# Patient Record
Sex: FEMALE | Race: WHITE
[De-identification: names, ages, dates, MRNs, and addresses within clinical notes are randomized per-mention and may not be internally consistent; named-entity substitution may affect disease eponyms.]

---

## 2017-06-07 ENCOUNTER — HOSPITAL ENCOUNTER (INPATIENT)
Dept: HOSPITAL 41 - JD.OB | Age: 27
LOS: 1 days | Discharge: HOME | End: 2017-06-08
Attending: OBSTETRICS & GYNECOLOGY | Admitting: OBSTETRICS & GYNECOLOGY
Payer: COMMERCIAL

## 2017-06-07 DIAGNOSIS — O09.293: ICD-10-CM

## 2017-06-07 DIAGNOSIS — Z3A.38: ICD-10-CM

## 2017-06-07 PROCEDURE — 4A1HXFZ MONITORING OF PRODUCTS OF CONCEPTION, CARDIAC RHYTHM, EXTERNAL APPROACH: ICD-10-PCS | Performed by: OBSTETRICS & GYNECOLOGY

## 2017-06-07 PROCEDURE — 0HQ9XZZ REPAIR PERINEUM SKIN, EXTERNAL APPROACH: ICD-10-PCS | Performed by: OBSTETRICS & GYNECOLOGY

## 2017-06-07 PROCEDURE — 3E0R3BZ INTRODUCTION OF ANESTHETIC AGENT INTO SPINAL CANAL, PERCUTANEOUS APPROACH: ICD-10-PCS

## 2017-06-07 NOTE — PCM.LDHP
L&D History of Present Illness





- General


Date of Service: 17


Admit Problem/Dx: 


 Patient Status Order with Admit Dx/Problem





17 06:49


Patient Status [ADT] Routine 








 Admission Diagnosis/Problem











Admission Diagnosis/Problem    Pregnancy














Source of Information: Patient


History Limitations: Reports: No Limitations





- History of Present Illness


Introduction:: 





Patient is a 25 y/o  at 38 6/7 wks who presents in active labor.  

Contractions started yesterday night, but did not get regular and painful until 

this morning.  Patient's pregnancy history is notable for a 24 week loss of a 

baby girl. All testing done at that time was negative. She then went on to have 

a full term delivery of a healthy baby boy.  This pregnancy has been 

uncomplicated. 





- Related Data


Allergies/Adverse Reactions: 


 Allergies











Allergy/AdvReac Type Severity Reaction Status Date / Time


 


No Known Allergies Allergy   Verified 14 21:48 CDT











Home Medications: 


 Home Meds





Magnesium  14 [History]


ZQC925/Iron Fumarate/FA/DSS [Prenatal 19 Tablet]  14 [History]


Acetaminophen [Tylenol Extra Strength] 500 mg PO Q4H PRN #30 tab 14 [Rx]


Ibuprofen [Motrin] 400 mg PO Q4H PRN #30 tablet 14 [Rx]











Past Medical History


Genitourinary History: Reports: Renal Calculus


OB/GYN History: Reports: Pregnancy, Spontaneous 


: 4


Para: 2 (1 living child)


LMP (Approximate): Pregnant





- Past Surgical History


Female  Surgical History: Reports: Lithotripsy/ESWL





Social & Family History





- Tobacco Use


Smoking Status *Q: Never Smoker


Second Hand Smoke Exposure: No





- Alcohol Use


Alcohol Use History: No


Days Per Week of Alcohol Use: 0





- Recreational Drug Use


Recreational Drug Use: No





H&P Review of Systems





- Review of Systems:


Review Of Systems: See Below


General: Reports: No Symptoms


Pulmonary: Reports: No Symptoms


Cardiovascular: Reports: No Symptoms


Gastrointestinal: Reports: No Symptoms


Genitourinary: Reports: No Symptoms


Musculoskeletal: Reports: No Symptoms





L&D Exam





- Exam


Exam: See Below





- Vital Signs


Weight: 54.522 kg





- OB Specific


Contraction Intensity: Strong


Fetal Movement: Active


Fetal Heart Tones: Present


Fetal Heart Tones per Min: 140


Fetal Heart Rate (FHR) Variability: Moderate (6-25 bmp)


Birth Presentation: Vertex





- Suero Score


Suero Score Cervix Position: Anterior


Suero Score Consistency: Soft


Suero Score Effacement: >80%


Suero Score Dilation: > 5 cm


Suero Score Infant's Station: +1, +2


Suero Score Total: 13





- Exam


General: Alert, Oriented, Cooperative


Lungs: Clear to Auscultation, Normal Respiratory Effort


Cardiovascular: Regular Rate, Regular Rhythm


Abdomen: Soft


Genitourinary: Normal external exam


Extremities: Normal Inspection


Skin: Warm, Dry, Intact





- Problem List


(1) 38 weeks gestation of pregnancy


SNOMED Code(s): 18009737


   ICD Code: Z3A.38 - 38 WEEKS GESTATION OF PREGNANCY   Status: Acute   Current 

Visit: Yes   





(2) GBS (group B Streptococcus carrier), +RV culture, currently pregnant


SNOMED Code(s): 59052892, 556782278


   ICD Code: O99.820 - STREPTOCOCCUS B CARRIER STATE COMPLICATING PREGNANCY   

Status: Acute   Current Visit: Yes   





(3) History of intrauterine fetal death in previous pregnancy


SNOMED Code(s): 860719535


   ICD Code: O09.299 - SUPRVSN OF PREG W POOR REPRODCTV OR OBSTET HISTORY, UNSP 

TRI   Status: Acute   Current Visit: Yes   





(4) Normal labor


SNOMED Code(s): 42295590


   ICD Code: O80 - ENCOUNTER FOR FULL-TERM UNCOMPLICATED DELIVERY; Z37.9 - 

OUTCOME OF DELIVERY, UNSPECIFIED   Status: Acute   Current Visit: Yes   


Problem List Initiated/Reviewed/Updated: Yes


Orders Last 24hrs: 


 Active Orders 24 hr











 Category Date Time Status


 


 Patient Status [ADT] Routine ADT  17 06:49 Active


 


 Activity as Tolerated [RC] PFP Care  17 06:49 Active


 


 Communication Order [RC] ASDIRECTED Care  17 06:49 Active


 


 Fetal Heart Tones [RC] ASDIRECTED Care  17 06:49 Active


 


 Notify Provider [RC] PFP Care  17 06:49 Active


 


 Notify Provider [RC] PRN Care  17 06:49 Active


 


 Peripheral IV Care [RC] .AS DIRECTED Care  17 06:49 Active


 


 Vital Signs [RC] PER UNIT ROUTINE Care  17 06:49 Active


 


 Clear Liquid Diet [DIET] Diet  17 Breakfast Active


 


 CBC W/O DIFF,HEMOGRAM [HEME] Stat Lab  17 06:49 Ordered


 


 Ampicillin 1 gm Med  17 11:00 Active





 Sodium Chloride 0.9% [Normal Saline] 100 ml   





 IV Q4H   


 


 Ampicillin 2 gm Med  17 06:49 Active





 Sodium Chloride 0.9% [Normal Saline] 100 ml   





 IV ONETIME   


 


 Lactated Ringers [Ringers, Lactated] 1,000 ml Med  17 07:00 Active





 IV ASDIRECTED   


 


 Ondansetron [Zofran] Med  17 06:49 Active





 4 mg IVPUSH Q4H PRN   


 


 Oxytocin/Lactated Ringers [Pitocin in LR 10 Units/1,000 Med  17 07:00 

Active





 ML]   





 10 unit in 1,000 ml IV TITRATE   


 


 Sodium Chloride 0.9% [Saline Flush] Med  17 06:49 Active





 10 ml FLUSH ASDIRECTED PRN   


 


 Electronic Fetal Heart Tones Ext w TOCO [WOMSER] Oth  17 06:49 Ordered





 Routine   


 


 Electronic Fetal Heart Tones Internal [WOMSER] Per Unit Oth  17 06:49 

Ordered





 Routine   


 


 Peripheral IV Insertion Adult [OM.PC] Routine Oth  17 06:49 Ordered


 


 Resuscitation Status Routine Resus Stat  17 06:49 Ordered








 Medication Orders





Ampicillin Sodium 2 gm/ Sodium (Chloride)  100 mls @ 200 mls/hr IV ONETIME ONE


   Stop: 17 07:18


Ampicillin Sodium 1 gm/ Sodium (Chloride)  100 mls @ 200 mls/hr IV Q4H MARILYN


Lactated Ringer's (Ringers, Lactated)  1,000 mls @ 100 mls/hr IV ASDIRECTED MARILYN


Oxytocin/Lactated Ringer's (Pitocin In Lr 10 Units/1,000 Ml)  10 unit in 1,000 

mls @ 500 mls/hr IV TITRATE MARILYN


Ondansetron HCl (Zofran)  4 mg IVPUSH Q4H PRN


   PRN Reason: Nausea/Vomiting


Sodium Chloride (Saline Flush)  10 ml FLUSH ASDIRECTED PRN


   PRN Reason: Keep Vein Open








Assessment/Plan Comment:: 





25 y/o  at 38 6/7 wks presents in labor





* CBC and T&S


* GBS positive, Ampicillin to be started


* Pain management per patient preference, would like epidural


* Anticipate 





Gloria Ruiz MD

## 2017-06-07 NOTE — PCM.DEL
L & D Note





- General Info


Date of Service: 17





- Delivery Note


Labor: spontaneous


Delivery Outcome: Livebirth


Infant Delivery Method: Spontaneous Vaginal Delivery


Infant Delivery Mode: Spontaneous


Birth Presentation: Right Occiput Anterior (NATALIE)


Nuchal cord: none


Anesthesia Type: Epidural


Amniotic Fluid Description: Clear


Episiotomy Type: None


Laceration: periurethral


Suture type: vicryl


Suture size: 3-0


Placenta: intact, spontaneous


Cord: 3 vessels


Estimated blood loss: 350


Resuscitation needed: Yes


: bulb syringe, stimulated, warmed, blanket used, warmer used


APGAR Score 1 min: 8


APGAR Score 5 min: 9


Delivery Comments (Free Text/Narrative):: 





Patient found to be complete and began pushing.  With maternal pushing effort 

fetal head delivered from an NATALIE presentation.  No nuchal cord present.  With 

gentle downward traction the fetal shoulders and body delivered.  Infant placed 

on maternal abdomen.  Cord clamped and cut.  Cord blood obtained. Placenta 

allowed time to separate and then spontaneously expelled. Inspection of the 

perineum showed a small periurethral tear which was bleeding and so repaired 

with an interrupted suture of 3-0 vicryl. 





- Patient Data


Vitals - most recent: 


 Last Vital Signs











Temp  36.9 C   17 07:49


 


Pulse  72   17 07:49


 


Resp  20   17 07:49


 


BP  103/69   17 07:49


 


Pulse Ox  100   17 07:49











Weight - most recent: 54.522 kg


Lab Results last 24 hrs: 


 Laboratory Results - last 24 hr











  17 Range/Units





  07:11 


 


WBC  14.40 H  (3.98-10.04)  K/mm3


 


RBC  3.76 L  (3.98-5.22)  M/mm3


 


Hgb  10.5 L  (11.2-15.7)  gm/L


 


Hct  33.1 L  (34.1-44.9)  %


 


MCV  88.0  (79.4-94.8)  fl


 


MCH  27.9  (25.6-32.2)  pg


 


MCHC  31.7 L  (32.2-35.5)  g/dl


 


RDW Std Deviation  44.9  (36.4-46.3)  fL


 


Plt Count  248  (182-369)  K/mm3


 


MPV  10.2  (9.4-12.3)  fl











Med Orders - Current: 


 Current Medications





Diphenhydramine HCl (Benadryl)  25 mg IVPUSH Q6H PRN


   PRN Reason: pruritis


Ephedrine Sulfate (Ephedrine Sulfate)  5 mg IVPUSH ASDIRECTED PRN


   PRN Reason: Hypotension


Fentanyl (Sublimaze)  100 mcg EPIDUR Q3H PRN


   PRN Reason: Pain


   Last Admin: 17 07:50 Dose:  100 mcg


Fentanyl/Bupivacaine HCl (Fentanyl/Bupivacaine/Ns 2 Mcg-0.125% 100 Ml)  100 ml 

EPIDUR ASDIRECTED MARILYN


   Last Admin: 17 07:51 Dose:  100 ml


Ampicillin Sodium 1 gm/ Sodium (Chloride)  100 mls @ 200 mls/hr IV Q4H MARILYN


Lactated Ringer's (Ringers, Lactated)  1,000 mls @ 100 mls/hr IV ASDIRECTED [facility]


   Last Admin: 17 07:10 Dose:  500 mls/hr


Oxytocin/Lactated Ringer's (Pitocin In Lr 10 Units/1,000 Ml)  10 unit in 1,000 

mls @ 500 mls/hr IV TITRATE MARILYN


Ondansetron HCl (Zofran)  4 mg IVPUSH Q4H PRN


   PRN Reason: Nausea/Vomiting


Sodium Chloride (Saline Flush)  10 ml FLUSH ASDIRECTED PRN


   PRN Reason: Keep Vein Open





Discontinued Medications





Ampicillin Sodium 2 gm/ Sodium (Chloride)  100 mls @ 200 mls/hr IV ONETIME ONE


   Stop: 17 07:18


   Last Admin: 17 07:11 Dose:  200 mls/hr


Lidocaine HCl (Xylocaine 1%)  50 ml INJECT ONETIME ONE


   Stop: 17 06:50











- Problem List & Annotations


(1) 38 weeks gestation of pregnancy


SNOMED Code(s): 25528905


   Code(s): Z3A.38 - 38 WEEKS GESTATION OF PREGNANCY   Status: Acute   Current 

Visit: Yes   





(2) GBS (group B Streptococcus carrier), +RV culture, currently pregnant


SNOMED Code(s): 00979845, 878125933


   Code(s): O99.820 - STREPTOCOCCUS B CARRIER STATE COMPLICATING PREGNANCY   

Status: Acute   Current Visit: Yes   





(3) History of intrauterine fetal death in previous pregnancy


SNOMED Code(s): 587237625


   Code(s): O09.299 - SUPRVSN OF PREG W POOR REPRODCTV OR OBSTET HISTORY, UNSP 

TRI   Status: Acute   Current Visit: Yes   





(4) Normal labor


SNOMED Code(s): 12137410


   Code(s): O80 - ENCOUNTER FOR FULL-TERM UNCOMPLICATED DELIVERY; Z37.9 - 

OUTCOME OF DELIVERY, UNSPECIFIED   Status: Acute   Current Visit: Yes   





(5) Vaginal delivery


SNOMED Code(s): 593101767


   Code(s): O80 - ENCOUNTER FOR FULL-TERM UNCOMPLICATED DELIVERY   Status: 

Acute   Current Visit: Yes   





- Problem List Review


Problem List Initiated/Reviewed/Updated: Yes





- My Orders


Last 24 Hours: 


My Active Orders





17 06:49


Patient Status [ADT] Routine 


Activity as Tolerated [RC] PFP 


Communication Order [RC] ASDIRECTED 


Fetal Heart Tones [RC] ASDIRECTED 


Notify Provider [RC] PFP 


Notify Provider [RC] PRN 


Peripheral IV Care [RC] .AS DIRECTED 


Vital Signs [RC] PER UNIT ROUTINE 


Ondansetron [Zofran]   4 mg IVPUSH Q4H PRN 


Sodium Chloride 0.9% [Saline Flush]   10 ml FLUSH ASDIRECTED PRN 


Electronic Fetal Heart Tones Ext w TOCO [WOMSER] Routine 


Electronic Fetal Heart Tones Internal [WOMSER] Per Unit Routine 


Peripheral IV Insertion Adult [OM.PC] Routine 


Resuscitation Status Routine 





17 07:00


Lactated Ringers [Ringers, Lactated] 1,000 ml IV ASDIRECTED 


Oxytocin/Lactated Ringers [Pitocin in LR 10 Units/1,000 ML] 10 unit in 1,000 ml 

IV TITRATE 





17 11:00


Ampicillin 1 gm   Sodium Chloride 0.9% [Normal Saline] 100 ml IV Q4H 





17 Breakfast


Clear Liquid Diet [DIET] 














- Assessment


Assessment:: 





27 y/o G4 now  PPD#0 from  at 38 6/7 wks 








- Plan


Plan:: 











* Routine cares


* Encourage breast feeding


* Discharge home in 1-2 days





Gloria Ruiz MD

## 2017-06-07 NOTE — PCM.PREANE
Preanesthetic Assessment





- Anesthesia/Transfusion/Family Hx


Anesthesia History: Prior Anesthesia Without Reaction


Family History of Anesthesia Reaction: No


Transfusion History: No Prior Transfusion(s)





- Review of Systems


General: No Symptoms


Pulmonary: No Symptoms


Cardiovascular: No Symptoms


Gastrointestinal: No symptoms


Neurological: No Symptoms


Other: Reports: None





- Physical Assessment


Pulse: 72


O2 Sat by Pulse Oximetry: 100


Respiratory Rate: 20


Blood Pressure: 103/69


Temperature: 98.5 F


Height: 5 ft 1 in


Weight: 54.522 kg


ASA Class: 2


Mental Status: Alert & Oriented x3


Airway Class: Mallampati = 1


Dentition: Reports: Normal Dentition


Thyro-Mental Finger Breadths: 3


Mouth Opening Finger Breadths: 3


ROM/Head Extension: Full


Lungs: Clear to auscultation, Normal respiratory effort


Cardiovascular: Regular Rate, Regular Rhythm





- Lab


Values: 





 Laboratory Last Values











WBC  14.40 K/mm3 (3.98-10.04)  H  17  07:11    


 


RBC  3.76 M/mm3 (3.98-5.22)  L  17  07:11    


 


Hgb  10.5 gm/L (11.2-15.7)  L  17  07:11    


 


Hct  33.1 % (34.1-44.9)  L  17  07:11    


 


MCV  88.0 fl (79.4-94.8)   17  07:11    


 


MCH  27.9 pg (25.6-32.2)   17  07:11    


 


MCHC  31.7 g/dl (32.2-35.5)  L  17  07:11    


 


RDW Std Deviation  44.9 fL (36.4-46.3)   17  07:11    


 


Plt Count  248 K/mm3 (182-369)   17  07:11    


 


MPV  10.2 fl (9.4-12.3)   17  07:11    














- Allergies


Allergies/Adverse Reactions: 


 Allergies











Allergy/AdvReac Type Severity Reaction Status Date / Time


 


No Known Allergies Allergy   Verified 14 21:48 CDT














- Blood


Blood Available: No





- Acknowledgements


Anesthesia Type Planned: Epidural


Pt an Appropriate Candidate for the Planned Anesthesia: Yes


Alternatives and Risks of Anesthesia Discussed w Pt/Guardian: Yes


Pt/Guardian Understands and Agrees with Anesthesia Plan: Yes





PreAnesthesia Questionnaire





- Past Health History


Medical/Surgical History: Denies Medical/Surgical History


Cardiovascular History: Reports: None


Respiratory History: Reports: None


Genitourinary History: Reports: Renal Calculus


OB/GYN History: Reports: Pregnancy, Spontaneous 


: 4 (39 weeks)


Para: 1





- Past Surgical History


Female  Surgical History: Reports: Lithotripsy/ESWL





- SUBSTANCE USE


Smoking Status *Q: Never Smoker


Tobacco Use Within Last Twelve Months: No


Second Hand Smoke Exposure: No


Days Per Week of Alcohol Use: 0


Recreational Drug Use History: No





- HOME MEDS


Home Medications: 


 Home Meds





Magnesium  14 [History]


JSC996/Iron Fumarate/FA/DSS [Prenatal 19 Tablet]  14 [History]


Acetaminophen [Tylenol Extra Strength] 500 mg PO Q4H PRN #30 tab 14 [Rx]


Ibuprofen [Motrin] 400 mg PO Q4H PRN #30 tablet 14 [Rx]











- CURRENT (IN HOUSE) MEDS


Current Meds: 





 Current Medications





Diphenhydramine HCl (Benadryl)  25 mg IVPUSH Q6H PRN


   PRN Reason: pruritis


Ephedrine Sulfate (Ephedrine Sulfate)  5 mg IVPUSH ASDIRECTED PRN


   PRN Reason: Hypotension


Fentanyl (Sublimaze)  100 mcg EPIDUR Q3H PRN


   PRN Reason: Pain


Fentanyl/Bupivacaine HCl (Fentanyl/Bupivacaine/Ns 2 Mcg-0.125% 100 Ml)  100 ml 

EPIDUR ASDIRECTED Pending sale to [facility]


Ampicillin Sodium 1 gm/ Sodium (Chloride)  100 mls @ 200 mls/hr IV Q4H Pending sale to [facility]


Lactated Ringer's (Ringers, Lactated)  1,000 mls @ 100 mls/hr IV ASDIRECTED Pending sale to [facility]


   Last Admin: 17 07:10 Dose:  500 mls/hr


Oxytocin/Lactated Ringer's (Pitocin In Lr 10 Units/1,000 Ml)  10 unit in 1,000 

mls @ 500 mls/hr IV TITRATE MARILYN


Ondansetron HCl (Zofran)  4 mg IVPUSH Q4H PRN


   PRN Reason: Nausea/Vomiting


Sodium Chloride (Saline Flush)  10 ml FLUSH ASDIRECTED PRN


   PRN Reason: Keep Vein Open





Discontinued Medications





Ampicillin Sodium 2 gm/ Sodium (Chloride)  100 mls @ 200 mls/hr IV ONETIME ONE


   Stop: 17 07:18


   Last Admin: 17 07:11 Dose:  200 mls/hr


Lidocaine HCl (Xylocaine 1%)  50 ml INJECT ONETIME ONE


   Stop: 17 06:50

## 2017-06-08 VITALS — DIASTOLIC BLOOD PRESSURE: 91 MMHG | SYSTOLIC BLOOD PRESSURE: 109 MMHG

## 2017-06-08 NOTE — PCM.PNPP
- General Info


Date of Service: 17


Functional Status: Reports: pain controlled, tolerating diet, ambulating, 

urinating





- Review of Systems


General: Reports: No Symptoms


Pulmonary: Reports: no symptoms


Cardiovascular: Reports: No Symptoms


Gastrointestinal: Reports: No symptoms


Genitourinary: Reports: no symptoms


Musculoskeletal: Reports: no symptoms





- Patient Data


Vital Signs - most recent: 


 Last Vital Signs











Temp  37.1 C   17 03:44


 


Pulse  81   17 03:29


 


Resp  18   17 03:29


 


BP  111/61   17 03:29


 


Pulse Ox  100   17 03:29











Weight - most recent: 63.458 kg


Lab Results - last 24 hrs: 


 Laboratory Results - last 24 hr











  17 Range/Units





  07:11 


 


WBC  14.40 H  (3.98-10.04)  K/mm3


 


RBC  3.76 L  (3.98-5.22)  M/mm3


 


Hgb  10.5 L  (11.2-15.7)  gm/L


 


Hct  33.1 L  (34.1-44.9)  %


 


MCV  88.0  (79.4-94.8)  fl


 


MCH  27.9  (25.6-32.2)  pg


 


MCHC  31.7 L  (32.2-35.5)  g/dl


 


RDW Std Deviation  44.9  (36.4-46.3)  fL


 


Plt Count  248  (182-369)  K/mm3


 


MPV  10.2  (9.4-12.3)  fl











Med Orders - Current: 


 Current Medications





Acetaminophen (Tylenol)  650 mg PO Q4H PRN


   PRN Reason: mild pain or fever


Benzocaine/Menthol (Dermoplast Pain Relief Spray)  0 gm TOP ASDIRECTED PRN


   PRN Reason: Perineal Comfort Measure


   Last Admin: 17 19:31 Dose:  1 applic


Docusate Sodium (Colace)  100 mg PO BID PRN


   PRN Reason: Constipation


Emollient Ointment (Lansinoh Hpa)  0 gm TOP ASDIRECTED PRN


   PRN Reason: Sore Nipples


Ibuprofen (Motrin)  600 mg PO Q6H PRN


   PRN Reason: Mild pain or fever


   Last Admin: 17 21:15 Dose:  600 mg


Witch Hazel (Tucks)  1 pad TOP ASDIRECTED PRN


   PRN Reason: Hemorrhoid pain


   Last Admin: 17 19:30 Dose:  1 applic





Discontinued Medications





Diphenhydramine HCl (Benadryl)  25 mg IVPUSH Q6H PRN


   PRN Reason: pruritis


Ephedrine Sulfate (Ephedrine Sulfate)  5 mg IVPUSH ASDIRECTED PRN


   PRN Reason: Hypotension


Fentanyl (Sublimaze)  100 mcg EPIDUR Q3H PRN


   PRN Reason: Pain


   Last Admin: 17 07:50 Dose:  100 mcg


Fentanyl/Bupivacaine HCl (Fentanyl/Bupivacaine/Ns 2 Mcg-0.125% 100 Ml)  100 ml 

EPIDUR ASDIRECTED MARILYN


   Last Admin: 17 07:51 Dose:  100 ml


Ampicillin Sodium 2 gm/ Sodium (Chloride)  100 mls @ 200 mls/hr IV ONETIME ONE


   Stop: 17 07:18


   Last Admin: 17 07:11 Dose:  200 mls/hr


Ampicillin Sodium 1 gm/ Sodium (Chloride)  100 mls @ 200 mls/hr IV Q4H [facility]


Lactated Ringer's (Ringers, Lactated)  1,000 mls @ 100 mls/hr IV ASDIRECTED [facility]


   Last Admin: 17 08:00 Dose:  500 mls/hr


Oxytocin/Lactated Ringer's (Pitocin In Lr 10 Units/1,000 Ml)  10 unit in 1,000 

mls @ 500 mls/hr IV TITRATE [facility]


   Last Admin: 17 09:10 Dose:  500 mls/hr


Lidocaine HCl (Xylocaine 1%)  50 ml INJECT ONETIME ONE


   Stop: 17 06:50


   Last Admin: 17 14:10 Dose:  Not Given


Ondansetron HCl (Zofran)  4 mg IVPUSH Q4H PRN


   PRN Reason: Nausea/Vomiting


Sodium Chloride (Saline Flush)  10 ml FLUSH ASDIRECTED PRN


   PRN Reason: Keep Vein Open











- Infant Interaction


Infant Disposition, Postpartum: [place] in Room with Family


Infant Interaction: Holding Infant


Infant Feeding:  Infant; Nursed Well


Support Person: 





- Postpartum Recovery Exam


Fundal Tone: Firm


Fundal Level: At Umbilicus


Fundal Placement: Midline


Lochia Amount: Small, Moderate


Lochia Color: Rubra/Red


Perineum Description: Intact, Minimal Bruising/Swelling


Bladder Status: Voiding





- Exam


General: alert, oriented, cooperative


Abdomen: soft, no tenderness


Extremities: no edema


Skin: warm, dry, intact





- Problem List & Annotations


(1) 38 weeks gestation of pregnancy


SNOMED Code(s): 68022773


   Code(s): Z3A.38 - 38 WEEKS GESTATION OF PREGNANCY   Status: Acute   Current 

Visit: Yes   





(2) GBS (group B Streptococcus carrier), +RV culture, currently pregnant


SNOMED Code(s): 29684697, 671987555


   Code(s): O99.820 - STREPTOCOCCUS B CARRIER STATE COMPLICATING PREGNANCY   

Status: Acute   Current Visit: Yes   





(3) History of intrauterine fetal death in previous pregnancy


SNOMED Code(s): 860420489


   Code(s): O09.299 - SUPRVSN OF PREG W POOR REPRODCTV OR OBSTET HISTORY, UNSP 

TRI   Status: Acute   Current Visit: Yes   





(4) Normal labor


SNOMED Code(s): 81879377


   Code(s): O80 - ENCOUNTER FOR FULL-TERM UNCOMPLICATED DELIVERY; Z37.9 - 

OUTCOME OF DELIVERY, UNSPECIFIED   Status: Acute   Current Visit: Yes   





(5) Vaginal delivery


SNOMED Code(s): 216328708


   Code(s): O80 - ENCOUNTER FOR FULL-TERM UNCOMPLICATED DELIVERY   Status: 

Acute   Current Visit: Yes   





- Problem List Review


Problem List Initiated/Reviewed/Updated: Yes





- My Orders


Last 24 Hours: 


My Active Orders





17 06:49


Activity as Tolerated [RC] PFP 


Communication Order [RC] ASDIRECTED 


Fetal Heart Tones [RC] ASDIRECTED 


Notify Provider [RC] PFP 


Notify Provider [RC] PRN 


Peripheral IV Care [RC] .AS DIRECTED 


Vital Signs [RC] PER UNIT ROUTINE 


Resuscitation Status Routine 





17 09:32


Activity as Tolerated [RC] 


Vital Signs [RC] 04,12,20 


Acetaminophen [Tylenol]   650 mg PO Q4H PRN 


Benzocaine/Menthol [Dermoplast Pain Relief Spray]   See Dose Instructions  TOP 

ASDIRECTED PRN 


Docusate Sodium [Colace]   100 mg PO BID PRN 


Ibuprofen [Motrin]   600 mg PO Q6H PRN 


Lanolin [Lansinoh HPA]   See Dose Instructions  TOP ASDIRECTED PRN 


Witch Hazel [Tucks]   1 pad TOP ASDIRECTED PRN 


Assess Lochia [WOMSER] Per Unit Routine 


Assess Uterine Involution [WOMSER] Per Unit Routine 


Breast Pump [WOMSER] Per Unit Routine 


Heat Therapy [OM.PC] PRN 


Ice Therapy [OM.PC] Per Unit Routine 


Perineal Care [OM.PC] Per Unit Routine 


Peripheral IV Discontinue [OM.PC] Routine 


Sitz Bath [OM.PC] Per Unit Routine 





17 Lunch


Regular Diet [DIET] 





17 09:32


Heat Therapy [OM.PC] PRN 














- Assessment


Assessment:: 





27 y/o G4 now  PPD#1 from  at 38 6/7 wks 








- Plan


Plan:: 











* Routine cares


* Encourage breast feeding


* Discharge home today





Gloria Ruiz MD

## 2017-06-08 NOTE — [id number]
Post Anesthesia Note





- EVALUATION WITHIN 48HRS OF ANESTHETIC


Vital Signs in Normal Range: Yes


Patient Participated in Evaluation: Yes


Respiratory Function Stable: Yes


Airway Patent: Yes


Cardiovascular Function Stable: Yes


Hydration Status Stable: Yes


Pain Control Satisfactory: Yes


Nausea and Vomiting Control Satisfactory: Yes


Mental Status Recovered: Yes





- COMMENTS/OBSERVATIONS


Free Text/Narrative:: 





no anesthesia complications noted

## 2020-02-06 NOTE — PCM.DCSUM1
**Discharge Summary





- Discharge Data


Discharge Date: 17


Discharge Disposition: Home, Self-Care 01


Condition: Good





- Discharge Diagnosis/Problem(s)


(1) 38 weeks gestation of pregnancy


SNOMED Code(s): 45480285


   ICD Code: Z3A.38 - 38 WEEKS GESTATION OF PREGNANCY   Status: Acute   Current 

Visit: Yes   





(2) GBS (group B Streptococcus carrier), +RV culture, currently pregnant


SNOMED Code(s): 44153200, 984114422


   ICD Code: O99.820 - STREPTOCOCCUS B CARRIER STATE COMPLICATING PREGNANCY   

Status: Acute   Current Visit: Yes   





(3) History of intrauterine fetal death in previous pregnancy


SNOMED Code(s): 712258485


   ICD Code: O09.299 - SUPRVSN OF PREG W POOR REPRODCTV OR OBSTET HISTORY, UNSP 

TRI   Status: Acute   Current Visit: Yes   





(4) Normal labor


SNOMED Code(s): 95098302


   ICD Code: O80 - ENCOUNTER FOR FULL-TERM UNCOMPLICATED DELIVERY; Z37.9 - 

OUTCOME OF DELIVERY, UNSPECIFIED   Status: Acute   Current Visit: Yes   





(5) Vaginal delivery


SNOMED Code(s): 343770858


   ICD Code: O80 - ENCOUNTER FOR FULL-TERM UNCOMPLICATED DELIVERY   Status: 

Acute   Current Visit: Yes   





- Patient Summary/Data


Complications: None


Consults: 


None


Recommended Follow-up Testing/Procedures: 





Follow up in 5-6 weeks for postpartum check 





Hospital Course: 





Patient is a 25 y/o  at 38 6/7 wks who presented in labor.  she 

progressed without the need for augmentation and underwent an uncomplicated 

vaginal delivery.  Please see delivery note for full details.  Postpartum she 

did well and was discharged home on postpartum day #1.





- Patient Instructions


Diet: Regular Diet as Tolerated


Activity: As Tolerated


Activity, Other: Pelvic Rest for 6 weeks


Driving: May Drive Today


Showering/Bathing: May Shower


Showering/Bathing, Other: May bathe


Notify Provider of: Fever, Increased Pain, Swelling and Redness, Drainage, 

Nausea and/or Vomiting





- Discharge Plan


Home Medications: 


 Home Meds





FIL577/Iron Fumarate/FA/DSS [Prenatal 19 Tablet] 1 tab PO DAILY 14 [

History]


Docusate Sodium [Colace] 100 mg PO BID PRN #0 cap 17 [Rx]


Ibuprofen [IJD: Ibuprofen] 600 mg PO Q6H PRN #0 tablet 17 [Rx]








Patient Handouts:  Breastfeeding, Breastfeeding and Mastitis, Home Care 

Instructions for Mom


Referrals: 


Gloria Ruiz MD [Primary Care Provider] -  (5-6 weeks for postpartum exam, 

call for appointment)





- Discharge Summary/Plan Comment


DC Time >30 min.: No





- Patient Data


Vitals - Most Recent: 


 Last Vital Signs











Temp  37.1 C   17 03:44


 


Pulse  81   17 03:29


 


Resp  18   17 03:29


 


BP  111/61   17 03:29


 


Pulse Ox  100   17 03:29











Weight - Most Recent: 63.458 kg


Lab Results - Last 24 hrs: 


 Laboratory Results - last 24 hr











  17 Range/Units





  07:11 


 


WBC  14.40 H  (3.98-10.04)  K/mm3


 


RBC  3.76 L  (3.98-5.22)  M/mm3


 


Hgb  10.5 L  (11.2-15.7)  gm/L


 


Hct  33.1 L  (34.1-44.9)  %


 


MCV  88.0  (79.4-94.8)  fl


 


MCH  27.9  (25.6-32.2)  pg


 


MCHC  31.7 L  (32.2-35.5)  g/dl


 


RDW Std Deviation  44.9  (36.4-46.3)  fL


 


Plt Count  248  (182-369)  K/mm3


 


MPV  10.2  (9.4-12.3)  fl











Med Orders - Current: 


 Current Medications





Acetaminophen (Tylenol)  650 mg PO Q4H PRN


   PRN Reason: mild pain or fever


Benzocaine/Menthol (Dermoplast Pain Relief Spray)  0 gm TOP ASDIRECTED PRN


   PRN Reason: Perineal Comfort Measure


   Last Admin: 17 19:31 Dose:  1 applic


Docusate Sodium (Colace)  100 mg PO BID PRN


   PRN Reason: Constipation


Emollient Ointment (Lansinoh Hpa)  0 gm TOP ASDIRECTED PRN


   PRN Reason: Sore Nipples


Ibuprofen (Motrin)  600 mg PO Q6H PRN


   PRN Reason: Mild pain or fever


   Last Admin: 17 21:15 Dose:  600 mg


Witch Hazel (Tucks)  1 pad TOP ASDIRECTED PRN


   PRN Reason: Hemorrhoid pain


   Last Admin: 17 19:30 Dose:  1 applic





Discontinued Medications





Diphenhydramine HCl (Benadryl)  25 mg IVPUSH Q6H PRN


   PRN Reason: pruritis


Ephedrine Sulfate (Ephedrine Sulfate)  5 mg IVPUSH ASDIRECTED PRN


   PRN Reason: Hypotension


Fentanyl (Sublimaze)  100 mcg EPIDUR Q3H PRN


   PRN Reason: Pain


   Last Admin: 17 07:50 Dose:  100 mcg


Fentanyl/Bupivacaine HCl (Fentanyl/Bupivacaine/Ns 2 Mcg-0.125% 100 Ml)  100 ml 

EPIDUR ASDIRECTED MARILYN


   Last Admin: 17 07:51 Dose:  100 ml


Ampicillin Sodium 2 gm/ Sodium (Chloride)  100 mls @ 200 mls/hr IV ONETIME ONE


   Stop: 17 07:18


   Last Admin: 17 07:11 Dose:  200 mls/hr


Ampicillin Sodium 1 gm/ Sodium (Chloride)  100 mls @ 200 mls/hr IV Q4H [facility]


Lactated Ringer's (Ringers, Lactated)  1,000 mls @ 100 mls/hr IV ASDIRECTED [facility]


   Last Admin: 17 08:00 Dose:  500 mls/hr


Oxytocin/Lactated Ringer's (Pitocin In Lr 10 Units/1,000 Ml)  10 unit in 1,000 

mls @ 500 mls/hr IV TITRATE [facility]


   Last Admin: 17 09:10 Dose:  500 mls/hr


Lidocaine HCl (Xylocaine 1%)  50 ml INJECT ONETIME ONE


   Stop: 17 06:50


   Last Admin: 17 14:10 Dose:  Not Given


Ondansetron HCl (Zofran)  4 mg IVPUSH Q4H PRN


   PRN Reason: Nausea/Vomiting


Sodium Chloride (Saline Flush)  10 ml FLUSH ASDIRECTED PRN


   PRN Reason: Keep Vein Open











*Q Meaningful Use (DIS)





- VTE *Q


VTE Criteria *Q: 








- Stroke *Q


Stroke Criteria *Q: 








- AMI *Q


AMI Criteria *Q: [phone]

## 2021-03-11 ENCOUNTER — HOSPITAL ENCOUNTER (INPATIENT)
Dept: HOSPITAL 41 - JD.OB | Age: 31
LOS: 2 days | Discharge: HOME | DRG: 560 | End: 2021-03-13
Attending: OBSTETRICS & GYNECOLOGY | Admitting: OBSTETRICS & GYNECOLOGY
Payer: COMMERCIAL

## 2021-03-11 DIAGNOSIS — Z20.822: ICD-10-CM

## 2021-03-11 DIAGNOSIS — Z3A.38: ICD-10-CM

## 2021-03-11 PROCEDURE — U0002 COVID-19 LAB TEST NON-CDC: HCPCS

## 2021-03-11 NOTE — PCM.PREANE
Preanesthetic Assessment





- Procedure


Proposed Procedure: 





Labor Epidural





- Anesthesia/Transfusion/Family Hx


Anesthesia History: Prior Anesthesia Without Reaction


Family History of Anesthesia Reaction: No


Transfusion History: No Prior Transfusion(s)





- Review of Systems


General: No Symptoms


Pulmonary: No Symptoms


Cardiovascular: No Symptoms


Gastrointestinal: No Symptoms


Neurological: No Symptoms


Other: Reports: None





- Physical Assessment


Vital Signs: 





                                Last Vital Signs











Temp  37.3 C   21 10:36


 


Pulse  88   21 10:36


 


Resp  14   21 10:36


 


BP  128/76   21 10:36


 


Pulse Ox  99   21 10:36











Height: 1.55 m


Weight: 68.492 kg


ASA Class: 2


Mental Status: Alert & Oriented x3


Airway Class: Mallampati = 1


Dentition: Reports: Normal Dentition


Thyro-Mental Finger Breadths: 3


Mouth Opening Finger Breadths: 3


ROM/Head Extension: Full


Lungs: Clear to Auscultation, Normal Respiratory Effort





- Lab


Values: 





                             Laboratory Last Values











WBC  13.27 K/mm3 (3.98-10.04)  H  21  10:55    


 


RBC  4.36 M/mm3 (3.98-5.22)   21  10:55    


 


Hgb  12.4 gm/dl (11.2-15.7)  D 21  10:55    


 


Hct  39.1 % (34.1-44.9)   21  10:55    


 


MCV  89.7 fl (79.4-94.8)   21  10:55    


 


MCH  28.4 pg (25.6-32.2)   21  10:55    


 


MCHC  31.7 g/dl (32.2-35.5)  L  21  10:55    


 


RDW Std Deviation  46.2 fL (36.4-46.3)   21  10:55    


 


Plt Count  259 K/mm3 (182-369)   21  10:55    


 


MPV  10.6 fl (9.4-12.3)   21  10:55    


 


Neut % (Auto)  69.5 % (34.0-71.1)   21  10:55    


 


Lymph % (Auto)  15.8 % (19.3-51.7)  L  21  10:55    


 


Mono % (Auto)  11.8 % (4.7-12.5)   21  10:55    


 


Eos % (Auto)  1.0  (0.7-5.8)   21  10:55    


 


Baso % (Auto)  0.2 % (0.1-1.2)   21  10:55    


 


Neut # (Auto)  9.23 K/mm3 (1.56-6.13)  H  21  10:55    


 


Lymph # (Auto)  2.10 K/mm3 (1.18-3.74)   21  10:55    


 


Mono # (Auto)  1.57 K/mm3 (0.24-0.36)  H  21  10:55    


 


Eos # (Auto)  0.13 K/mm3 (0.04-0.36)   21  10:55    


 


Baso # (Auto)  0.02 K/mm3 (0.01-0.08)   21  10:55    


 


Manual Slide Review  Abnormal smear   21  10:55    


 


SARS-CoV-2 RNA (TAN)  Negative  (NEGATIVE)   21  11:30    


 


Blood Type  B POSITIVE   21  10:55    


 


Gel Antibody Screen  Negative   21  10:55    














- Allergies


Allergies/Adverse Reactions: 


                                    Allergies











Allergy/AdvReac Type Severity Reaction Status Date / Time


 


No Known Allergies Allergy   Verified 21 20:22














- Acknowledgements


Anesthesia Type Planned: Epidural


Pt an Appropriate Candidate for the Planned Anesthesia: Yes


Alternatives and Risks of Anesthesia Discussed w Pt/Guardian: Yes


Pt/Guardian Understands and Agrees with Anesthesia Plan: Yes





PreAnesthesia Questionnaire





- Past Health History


Medical/Surgical History: Denies Medical/Surgical History


Cardiovascular History: Reports: None


Respiratory History: Reports: None


Genitourinary History: Reports: Renal Calculus


OB/GYN History: Reports: Pregnancy, Spontaneous 


Other OB/BYN History: Hx of 24wk fetal demise





- Past Surgical History


Female  Surgical History: Reports: Lithotripsy/ESWL





- SUBSTANCE USE


Tobacco Use Status *Q: Never Tobacco User


Recreational Drug Use History: No





- HOME MEDS


Home Medications: 


                                    Home Meds





Prenat 115/Iron Fum/Folic/Dss [Prenatal 19 Tablet] 1 tab PO DAILY 14 

[History]


Omeprazole Magnesium [Prilosec Otc] 20 mg PO DAILY 21 [History]

## 2021-03-11 NOTE — PCM.LDHP
L&D History of Present Illness





- General


Date of Service: 21


Admit Problem/Dx: 


                           Admission Diagnosis/Problem











Admission Diagnosis/Problem    














Source of Information: Patient


History Limitations: Reports: No Limitations





- History of Present Illness


Introduction:: 





Patient is a 31 y/o  at 38 0/7 wks who presents to L&D for IOL after 

being seen in clinic.  Has history of 24 week IUFD.  Has been having weekly 

NST's.  Today's NST had a late deceleration.  Given gestational age, late 

deceleration, and patient history was advised to present for IOL





- Related Data


Allergies/Adverse Reactions: 


                                    Allergies











Allergy/AdvReac Type Severity Reaction Status Date / Time


 


No Known Allergies Allergy   Verified 17 14:21











Home Medications: 


                                    Home Meds





Prenat 115/Iron Fum/Folic/Dss [Prenatal 19 Tablet] 1 tab PO DAILY 14 

[History]


Omeprazole Magnesium [Prilosec Otc] 20 mg PO DAILY 21 [History]











Past Medical History


Genitourinary History: Reports: Renal Calculus


OB/GYN History: Reports: Pregnancy, Spontaneous 


: 5


Para: 3 (2 full term, 1 24 wk IUFD)


LMP (Approximate): Pregnant





- Past Surgical History


Female  Surgical History: Reports: Lithotripsy/ESWL





Social & Family History





- Family History


Family Medical History: No Pertinent Family History





- Tobacco Use


Tobacco Use Status *Q: Never Tobacco User





- Caffeine Use


Caffeine Use: Reports: Soda





- Alcohol Use


Alcohol Use History: No





- Recreational Drug Use


Recreational Drug Use: No





H&P Review of Systems





- Review of Systems:


Review Of Systems: See Below


General: Reports: No Symptoms


Pulmonary: Reports: No Symptoms


Cardiovascular: Reports: No Symptoms


Gastrointestinal: Reports: No Symptoms


Genitourinary: Reports: No Symptoms


Musculoskeletal: Reports: No Symptoms


Psychiatric: Reports: No Symptoms


Neurological: Reports: No Symptoms





L&D Exam





- Exam


Exam: See Below





- Vital Signs


Vital Signs: 


                                Last Vital Signs











Temp  37.3 C   21 10:36


 


Pulse  88   21 10:36


 


Resp  14   21 10:36


 


BP  128/76   21 10:36


 


Pulse Ox  99   21 10:36











Weight: 68.492 kg





- OB Specific


Contraction Intensity: Irritability


Fetal Movement: Active


Fetal Heart Tones: Present


Fetal Heart Tones per Min: 150


Fetal Heart Rate (FHR) Variability: Moderate (6-25 bmp)


Birth Presentation: Vertex





- Suero Score


Suero Score Cervix Position: Posterior


Suero Score Consistency: Soft


Suero Score Effacement: 51-70%


Suero Score Dilation: 1-2 cm


Suero Score Infant's Station: -2


Suero Score Total: 6





- Exam


General: Alert, Oriented, Cooperative


Lungs: Clear to Auscultation, Normal Respiratory Effort


Cardiovascular: Regular Rate, Regular Rhythm


GI/Abdominal Exam: Soft, Non-Tender


Genitourinary: Normal external exam


Extremities: Normal Inspection


Skin: Warm, Dry, Intact





- Patient Data


Lab Results Last 24 hrs: 


                         Laboratory Results - last 24 hr











  21 Range/Units





  10:55 10:55 


 


WBC  13.27 H   (3.98-10.04)  K/mm3


 


RBC  4.36   (3.98-5.22)  M/mm3


 


Hgb  12.4  D   (11.2-15.7)  gm/dl


 


Hct  39.1   (34.1-44.9)  %


 


MCV  89.7   (79.4-94.8)  fl


 


MCH  28.4   (25.6-32.2)  pg


 


MCHC  31.7 L   (32.2-35.5)  g/dl


 


RDW Std Deviation  46.2   (36.4-46.3)  fL


 


Plt Count  259   (182-369)  K/mm3


 


MPV  10.6   (9.4-12.3)  fl


 


Neut % (Auto)  69.5   (34.0-71.1)  %


 


Lymph % (Auto)  15.8 L   (19.3-51.7)  %


 


Mono % (Auto)  11.8   (4.7-12.5)  %


 


Eos % (Auto)  1.0   (0.7-5.8)  


 


Baso % (Auto)  0.2   (0.1-1.2)  %


 


Neut # (Auto)  9.23 H   (1.56-6.13)  K/mm3


 


Lymph # (Auto)  2.10   (1.18-3.74)  K/mm3


 


Mono # (Auto)  1.57 H   (0.24-0.36)  K/mm3


 


Eos # (Auto)  0.13   (0.04-0.36)  K/mm3


 


Baso # (Auto)  0.02   (0.01-0.08)  K/mm3


 


Manual Slide Review  Abnormal smear   


 


Blood Type   B POSITIVE  


 


Gel Antibody Screen   Negative  











Result Diagrams: 


                                 21 10:55








- Problem List


(1) Abnormal  test


SNOMED Code(s): 397955705, 611548320


   ICD Code: O28.9 - UNSP ABNORMAL FINDINGS ON  SCREENING OF MOTHER   

Status: Acute   Current Visit: Yes   





(2) 38 weeks gestation of pregnancy


SNOMED Code(s): 58588803


   ICD Code: Z3A.38 - 38 WEEKS GESTATION OF PREGNANCY   Status: Acute   Current 

Visit: No   





(3) GBS (group B Streptococcus carrier), +RV culture, currently pregnant


SNOMED Code(s): 4722139672045, 039378606, 6267224682685


   ICD Code: O99.820 - STREPTOCOCCUS B CARRIER STATE COMPLICATING PREGNANCY   

Status: Acute   Current Visit: No   


Problem List Initiated/Reviewed/Updated: Yes


Orders Last 24hrs: 


                               Active Orders 24 hr











 Category Date Time Status


 


 Activity as Tolerated [RC] PFP Care  21 10:36 Active


 


 Communication Order [RC] ASDIRECTED Care  21 10:36 Active


 


 Fetal Heart Tones [RC] ASDIRECTED Care  21 10:36 Active


 


 Notify Provider [RC] PFP Care  21 10:36 Active


 


 Notify Provider [RC] PRN Care  21 10:36 Active


 


 Peripheral IV Care [RC] .AS DIRECTED Care  21 10:36 Active


 


 Vital Signs [RC] PER UNIT ROUTINE Care  21 10:36 Active


 


 Regular Diet [DIET] Diet  21 Lunch Active


 


 CORONAVIRUS COVID-19 TAN [MOLEC] Stat Lab  21 11:30 Received


 


 PATIENT RETYPE [BBK] Routine Lab  21 11:58 Ordered


 


 RAPID PLASMA REAGIN,RPR [CHEM] Routine Lab  21 10:55 Received


 


 Ampicillin 1 gm Med  21 15:00 Active





 Sodium Chloride 0.9% [Normal Saline] 100 ml   





 IV Q4H   


 


 Lactated Ringers [Ringers, Lactated] 1,000 ml Med  21 10:45 Active





 IV ASDIRECTED   


 


 Nalbuphine [Nubain] Med  21 10:36 Active





 10 mg IVPUSH Q2H PRN   


 


 Oxytocin/Lactated Ringers [Pitocin in LR 10 Units/1,000 Med  21 10:45 

Active





 ML]   





 10 unit in 1,000 ml IV .CONTINUOUS   


 


 Oxytocin/Lactated Ringers [Pitocin in LR 10 Units/1,000 Med  21 10:45 

Active





 ML]   





 10 unit in 1,000 ml IV .CONTINUOUS   


 


 Sodium Chloride 0.9% [Saline Flush] Med  21 10:36 Active





 10 ml FLUSH ASDIRECTED PRN   


 


 Electronic Fetal Heart Tones Ext w TOCO [WOMSER] Oth  21 10:36 Ordered





 Routine   


 


 Electronic Fetal Heart Tones Internal [WOMSER] Per Unit Oth  21 10:36 

Ordered





 Routine   


 


 Peripheral IV Insertion Adult [OM.PC] Routine Oth  21 10:36 Ordered


 


 Resuscitation Status Routine Resus Stat  21 10:36 Ordered











Assessment/Plan Comment:: 





* Labs to be done


* GBS positive, Ampicillin 


* Pitocin for IOL, AROM when able 


* Pain management per patient preference 


* Anticipate

## 2021-03-12 PROCEDURE — 3E033VJ INTRODUCTION OF OTHER HORMONE INTO PERIPHERAL VEIN, PERCUTANEOUS APPROACH: ICD-10-PCS | Performed by: OBSTETRICS & GYNECOLOGY

## 2021-03-12 PROCEDURE — 10907ZC DRAINAGE OF AMNIOTIC FLUID, THERAPEUTIC FROM PRODUCTS OF CONCEPTION, VIA NATURAL OR ARTIFICIAL OPENING: ICD-10-PCS | Performed by: OBSTETRICS & GYNECOLOGY

## 2021-03-12 PROCEDURE — 3E0R3BZ INTRODUCTION OF ANESTHETIC AGENT INTO SPINAL CANAL, PERCUTANEOUS APPROACH: ICD-10-PCS | Performed by: OBSTETRICS & GYNECOLOGY

## 2021-03-12 PROCEDURE — 0UQMXZZ REPAIR VULVA, EXTERNAL APPROACH: ICD-10-PCS | Performed by: OBSTETRICS & GYNECOLOGY

## 2021-03-12 PROCEDURE — 00HU33Z INSERTION OF INFUSION DEVICE INTO SPINAL CANAL, PERCUTANEOUS APPROACH: ICD-10-PCS | Performed by: OBSTETRICS & GYNECOLOGY

## 2021-03-12 NOTE — PCM.DEL
L & D Note





- General Info


Date of Service: 21





- Delivery Note


Labor: Induced by ARM, Induced by Oxytocin


Delivery Outcome: Livebirth


Infant Delivery Method: Spontaneous Vaginal Delivery-Single


Infant Delivery Mode: Spontaneous


Birth Presentation: Left Occiput Anterior (PEPE)


Nuchal Cord: None


Anesthesia Type: Epidural


Amniotic Fluid Description: Clear


Episiotomy Type: None


Laceration: Labial


Suture type: Vicryl


Suture size: 3-0


Placenta: Intact, Spontaneous


Cord: 3 Vessels


Estimated Blood Loss: 100


: Bulb Syringe, Stimulated, Warmed, [place] Used, Warmer Used


Delivery Comments (Free Text/Narrative):: 





Patient found to be complete and began pushing.  With maternal pushing effort 

fetal head delivered from an PEPE presentation.  No nuchal cord present.  With 

gentle downward traction the fetal shoulders and body delivered.  Infant placed 

on maternal abdomen.  Cord clamped and cut.  Cord blood obtained.  Placenta 

allowed time to separate and expelled intact.  Inspection of perineum showed a 

small left labial laceration.  This was repaired with a 3-0 Vicryl.  





- General Info


Date of Service: 21





- Patient Data


Vitals - Most Recent: 


                                Last Vital Signs











Temp  37.4 C   21 19:30


 


Pulse  80   21 19:30


 


Resp  14   21 19:30


 


BP  131/80   21 19:30


 


Pulse Ox  98   21 19:30











Weight - Most Recent: 68.492 kg





- Problem List & Annotations


(1) Abnormal  test


SNOMED Code(s): 098769294, 785924430


   Code(s): O28.9 - UNSP ABNORMAL FINDINGS ON  SCREENING OF MOTHER   

Status: Acute   Current Visit: Yes   





(2) 38 weeks gestation of pregnancy


SNOMED Code(s): 83067966


   Code(s): Z3A.38 - 38 WEEKS GESTATION OF PREGNANCY   Status: Acute   Current 

Visit: No   





(3) GBS (group B Streptococcus carrier), +RV culture, currently pregnant


SNOMED Code(s): 0122294255515, 679831682, 5349551988680


   Code(s): O99.820 - STREPTOCOCCUS B CARRIER STATE COMPLICATING PREGNANCY   

Status: Acute   Current Visit: No   





(4) Vaginal delivery


SNOMED Code(s): 329797420


   Code(s): O80 - ENCOUNTER FOR FULL-TERM UNCOMPLICATED DELIVERY   Status: Acute

  Current Visit: No   





- Problem List Review


Problem List Initiated/Reviewed/Updated: Yes





- My Orders


Last 24 Hours: 


My Active Orders





21 10:36


Activity as Tolerated [RC] PFP 


Communication Order [RC] ASDIRECTED 


Fetal Heart Tones [RC] ASDIRECTED 


Notify Provider [RC] PFP 


Notify Provider [RC] PRN 


Peripheral IV Care [RC] .AS DIRECTED 


Vital Signs [RC] PER UNIT ROUTINE 


Nalbuphine [Nubain]   10 mg IVPUSH Q2H PRN 


Sodium Chloride 0.9% [Saline Flush]   10 ml FLUSH ASDIRECTED PRN 


Electronic Fetal Heart Tones Ext w TOCO [WOMSER] Routine 


Electronic Fetal Heart Tones Internal [WOMSER] Per Unit Routine 


Peripheral IV Insertion Adult [OM.PC] Routine 


Resuscitation Status Routine 





21 10:45


Lactated Ringers [Ringers, Lactated] 1,000 ml IV ASDIRECTED 


Oxytocin/Lactated Ringers [Pitocin in LR 10 Units/1,000 ML] 10 unit in 1,000 ml 

IV .CONTINUOUS 


Oxytocin/Lactated Ringers [Pitocin in LR 10 Units/1,000 ML] 10 unit in 1,000 ml 

IV .CONTINUOUS 





21 10:55


RAPID PLASMA REAGIN,RPR [CHEM] Routine 





21 Lunch


Regular Diet [DIET] 





21 16:00


Ampicillin 1 gm   Sodium Chloride 0.9% [Normal Saline] 100 ml IV Q4H 





21 17:30


Oxytocin/Lactated Ringers [Pitocin in LR 10 Units/1,000 ML] 10 unit in 1,000 ml 

IV TITRATE 














- Assessment


Assessment:: 





PPD#0





- Plan


Plan:: 





* Routine cares 


* Breast feeding


* Discharge home in 1-2 days

## 2021-03-12 NOTE — [id number]
Post Anesthesia Note





- EVALUATION WITHIN 48HRS OF ANESTHETIC


Vital Signs in Normal Range: Yes


Patient Participated in Evaluation: Yes


Respiratory Function Stable: Yes


Airway Patent: Yes


Cardiovascular Function Stable: Yes


Hydration Status Stable: Yes


Pain Control Satisfactory: Yes


Nausea and Vomiting Control Satisfactory: Yes


Mental Status Recovered: Yes


Vital Signs: 


                                Last Vital Signs











Temp  37.4 C   03/11/21 19:30


 


Pulse  80   03/11/21 19:30


 


Resp  14   03/11/21 19:30


 


BP  131/80   03/11/21 19:30


 


Pulse Ox  98   03/11/21 19:30

## 2021-03-13 VITALS — HEART RATE: 81 BPM | DIASTOLIC BLOOD PRESSURE: 69 MMHG | SYSTOLIC BLOOD PRESSURE: 122 MMHG

## 2021-03-13 NOTE — PCM.DCSUM1
**Discharge Summary





- Discharge Data


Discharge Date: 21


Discharge Disposition: Home, Self-Care 01


Condition: Good





- Referral to Home Health


Primary Care Physician: 


PCP None








- Discharge Diagnosis/Problem(s)


(1) Abnormal  test


SNOMED Code(s): 611127923, 469086269


   ICD Code: O28.9 - UNSP ABNORMAL FINDINGS ON  SCREENING OF MOTHER   

Status: Acute   Current Visit: Yes   





(2) 38 weeks gestation of pregnancy


SNOMED Code(s): 12822278


   ICD Code: Z3A.38 - 38 WEEKS GESTATION OF PREGNANCY   Status: Acute   Current 

Visit: No   





(3) GBS (group B Streptococcus carrier), +RV culture, currently pregnant


SNOMED Code(s): 2506228236476, 181999164, 1563989193003


   ICD Code: O99.820 - STREPTOCOCCUS B CARRIER STATE COMPLICATING PREGNANCY   

Status: Acute   Current Visit: No   





(4) Vaginal delivery


SNOMED Code(s): 362852622


   ICD Code: O80 - ENCOUNTER FOR FULL-TERM UNCOMPLICATED DELIVERY   Status: 

Acute   Current Visit: No   





- Patient Summary/Data


Complications: None


Consults: 


None


Recommended Follow-up Testing/Procedures: 





Follow up in 3 weeks for postpartum check 


Hospital Course: 





29 y/o  at 38 0/7 wks who had an NST in clinic for history of IUFD which

was notable for late decelerations.  Given this finding recommendation was for 

induction.  Done with pitocin and AROM.  Progressed well to complete dilation 

and underwent an uncomplicated .  See delivery note.  Postpartum did well and

was discharged home on PPD#1





- Patient Instructions


Diet: Regular Diet as Tolerated


Activity: As Tolerated


Activity, Other: Pelvic rest for 6 weeks


Driving: May Drive Today


Showering/Bathing: May Shower


Showering/Bathing, Other: May bathe 


Notify Provider of: Fever, Increased Pain, Swelling and Redness, Drainage, 

Nausea and/or Vomiting





- Discharge Plan


*PRESCRIPTION DRUG MONITORING PROGRAM REVIEWED*: No


*COPY OF PRESCRIPTION DRUG MONITORING REPORT IN PATIENT RORY: No


Home Medications: 


                                    Home Meds





Prenat 115/Iron Fum/Folic/Dss [Prenatal 19 Tablet] 1 tab PO DAILY 14 

[History]


Omeprazole Magnesium [Prilosec Otc] 20 mg PO DAILY 21 [History]


Docusate Sodium [Colace] 100 mg PO BID PRN  cap 21 [Rx]


Ibuprofen [Motrin] 600 mg PO Q6H PRN  tablet 21 [Rx]








Referrals: 


Gloria Ruiz MD [Physician] -  (3 weeks for postpartum check - can be 

telehealth appt)





- Discharge Summary/Plan Comment


DC Time >30 min.: No





- Patient Data


Vitals - Most Recent: 


                                Last Vital Signs











Temp  36.6 C   21 03:31


 


Pulse  64   21 03:31


 


Resp  16   21 03:31


 


BP  105/52 L  21 03:31


 


Pulse Ox  99   21 03:31











Weight - Most Recent: 68.492 kg


Lab Results - Last 24 hrs: 


                         Laboratory Results - last 24 hr











  21 Range/Units





  10:55 


 


RPR  Non-reactive  (NONREACTIVE)  











Med Orders - Current: 


                               Current Medications





Acetaminophen (Acetaminophen 325 Mg Tab)  650 mg PO Q4H PRN


   PRN Reason: mild pain or fever


Benzocaine/Menthol (Benzocaine/Menthol 20%-0.5% Spray 56 Gm Canister)  0 gm TOP 

ASDIRECTED PRN


   PRN Reason: Perineal Comfort Measure


   Last Admin: 21 03:12 Dose:  1 canister


   Documented by: 


Docusate Sodium (Docusate Sodium 100 Mg Cap)  100 mg PO BID PRN


   PRN Reason: Constipation


Ibuprofen (Ibuprofen 600 Mg Tab)  600 mg PO Q6H PRN


   PRN Reason: Mild pain or fever


   Last Admin: 21 20:36 Dose:  600 mg


   Documented by: 


Witch Hazel (Witch Hazel Medicated Pads 40/Jar)  1 pad TOP ASDIRECTED PRN


   PRN Reason: Perineal Comfort Measure





Discontinued Medications





Bupivacaine HCl (Bupivacaine 0.25% 10 Ml Sdv)  10 ml .ROUTE .STK-MED ONE


   Stop: 21 15:01


Diphenhydramine HCl (Diphenhydramine 50 Mg/Ml Sdv)  25 mg IVPUSH Q6H PRN


   PRN Reason: pruritis


Ephedrine Sulfate (Ephedrine 50 Mg/Ml Sdv)  5 mg IVPUSH ASDIRECTED PRN


   PRN Reason: Hypotension


Ephedrine Sulfate (Ephedrine 50 Mg/Ml Sdv)  50 mg .ROUTE .STK-MED ONE


   Stop: 21 15:01


Fentanyl (Fentanyl 100 Mcg/2 Ml Sdv)  100 mcg EPIDUR Q3H PRN


   PRN Reason: Pain


   Last Admin: 21 23:22 Dose:  100 mcg


   Documented by: 


Fentanyl (Fentanyl 100 Mcg/2 Ml Sdv) Confirm Administered Dose 100 mcg .ROUTE 

.STK-MED ONE


   Stop: 21 21:40


   Last Admin: 21 04:16 Dose:  Not Given


   Documented by: 


Fentanyl/Bupivacaine HCl (Bupivacaine/Fentanyl/Ns 100 Ml Bag)  100 ml EPIDUR 

ASDIRECTED PRN


   PRN Reason: Pain


   Last Admin: 21 23:22 Dose:  100 ml


   Documented by: 


Oxytocin/Lactated Ringer's (Pitocin In Lr 10 Units/1,000 Ml)  10 unit in 1,000 

mls @ 100 mls/hr IV .CONTINUOUS MARILYN


Oxytocin/Lactated Ringer's (Pitocin In Lr 10 Units/1,000 Ml)  10 unit in 1,000 

mls @ 500 mls/hr IV .CONTINUOUS MARILYN


Ampicillin Sodium 2 gm/ Sodium (Chloride)  100 mls @ 200 mls/hr IV ONETIME ONE


   Stop: 21 11:29


   Last Admin: 21 11:58 Dose:  200 mls/hr


   Documented by: 


Ampicillin Sodium 1 gm/ Sodium (Chloride)  100 mls @ 200 mls/hr IV Q4H MARILYN


   Last Admin: 21 03:24 Dose:  200 mls/hr


   Documented by: 


Lactated Ringer's (Ringers, Lactated)  1,000 mls @ 100 mls/hr IV ASDIRECTED MARILYN


   Last Admin: 21 22:28 Dose:  100 mls/hr


   Documented by: 


Ampicillin Sodium 1 gm/ Sodium (Chloride)  100 mls @ 200 mls/hr IV Q4H MARILYN


   Last Admin: 21 04:15 Dose:  Not Given


   Documented by: 


Oxytocin/Lactated Ringer's (Pitocin In Lr 10 Units/1,000 Ml)  10 unit in 1,000 

mls @ 12 mls/hr IV TITRATE MARILYN; Protocol


   Last Titration: 21 01:00 Dose:  83.33 munits/min, 499.98 mls/hr


   Documented by: 


Nalbuphine HCl (Nalbuphine 10 Mg/1 Ml Vial)  10 mg IVPUSH Q2H PRN


   PRN Reason: Pain


Phenylephrine HCl (Phenylephrine/Normal Saline 100 Mcg/Ml 10 Ml Syringe)  1 mg 

.ROUTE .ST-MED ONE


   Stop: 21 15:01


Sodium Chloride (Sodium Chloride 0.9% 10 Ml Syringe)  10 ml FLUSH ASDIRECTED PRN


   PRN Reason: Keep Vein Open

## 2021-03-13 NOTE — PCM.PNPP
- General Info


Date of Service: 21


Functional Status: Reports: Pain Controlled, Tolerating Diet, Ambulating, 

Urinating





- Review of Systems


General: Reports: No Symptoms


Pulmonary: Reports: No Symptoms


Cardiovascular: Reports: No Symptoms


Gastrointestinal: Reports: No Symptoms


Genitourinary: Reports: No Symptoms


Musculoskeletal: Reports: No Symptoms





- Patient Data


Vital Signs - Most Recent: 


                                Last Vital Signs











Temp  36.6 C   21 03:31


 


Pulse  64   21 03:31


 


Resp  16   21 03:31


 


BP  105/52 L  21 03:31


 


Pulse Ox  99   21 03:31











Weight - Most Recent: 68.492 kg


Lab Results - Last 24 Hours: 


                         Laboratory Results - last 24 hr











  21 Range/Units





  10:55 


 


RPR  Non-reactive  (NONREACTIVE)  











Med Orders - Current: 


                               Current Medications





Acetaminophen (Acetaminophen 325 Mg Tab)  650 mg PO Q4H PRN


   PRN Reason: mild pain or fever


Benzocaine/Menthol (Benzocaine/Menthol 20%-0.5% Spray 56 Gm Canister)  0 gm TOP 

ASDIRECTED PRN


   PRN Reason: Perineal Comfort Measure


   Last Admin: 21 03:12 Dose:  1 canister


   Documented by: 


Docusate Sodium (Docusate Sodium 100 Mg Cap)  100 mg PO BID PRN


   PRN Reason: Constipation


Ibuprofen (Ibuprofen 600 Mg Tab)  600 mg PO Q6H PRN


   PRN Reason: Mild pain or fever


   Last Admin: 21 20:36 Dose:  600 mg


   Documented by: 


Witch Hazel (Witch Hazel Medicated Pads 40/Jar)  1 pad TOP ASDIRECTED PRN


   PRN Reason: Perineal Comfort Measure





Discontinued Medications





Bupivacaine HCl (Bupivacaine 0.25% 10 Ml Sdv)  10 ml .ROUTE .STK-MED ONE


   Stop: 21 15:01


Diphenhydramine HCl (Diphenhydramine 50 Mg/Ml Sdv)  25 mg IVPUSH Q6H PRN


   PRN Reason: pruritis


Ephedrine Sulfate (Ephedrine 50 Mg/Ml Sdv)  5 mg IVPUSH ASDIRECTED PRN


   PRN Reason: Hypotension


Ephedrine Sulfate (Ephedrine 50 Mg/Ml Sdv)  50 mg .ROUTE .STK-MED ONE


   Stop: 21 15:01


Fentanyl (Fentanyl 100 Mcg/2 Ml Sdv)  100 mcg EPIDUR Q3H PRN


   PRN Reason: Pain


   Last Admin: 21 23:22 Dose:  100 mcg


   Documented by: 


Fentanyl (Fentanyl 100 Mcg/2 Ml Sdv) Confirm Administered Dose 100 mcg .ROUTE 

.STK-MED ONE


   Stop: 21 21:40


   Last Admin: 21 04:16 Dose:  Not Given


   Documented by: 


Fentanyl/Bupivacaine HCl (Bupivacaine/Fentanyl/Ns 100 Ml Bag)  100 ml EPIDUR 

ASDIRECTED PRN


   PRN Reason: Pain


   Last Admin: 21 23:22 Dose:  100 ml


   Documented by: 


Oxytocin/Lactated Ringer's (Pitocin In Lr 10 Units/1,000 Ml)  10 unit in 1,000 

mls @ 100 mls/hr IV .CONTINUOUS MARILYN


Oxytocin/Lactated Ringer's (Pitocin In Lr 10 Units/1,000 Ml)  10 unit in 1,000 

mls @ 500 mls/hr IV .CONTINUOUS MARILYN


Ampicillin Sodium 2 gm/ Sodium (Chloride)  100 mls @ 200 mls/hr IV ONETIME ONE


   Stop: 21 11:29


   Last Admin: 21 11:58 Dose:  200 mls/hr


   Documented by: 


Ampicillin Sodium 1 gm/ Sodium (Chloride)  100 mls @ 200 mls/hr IV Q4H MARILYN


   Last Admin: 21 03:24 Dose:  200 mls/hr


   Documented by: 


Lactated Ringer's (Ringers, Lactated)  1,000 mls @ 100 mls/hr IV ASDIRECTED MARILYN


   Last Admin: 21 22:28 Dose:  100 mls/hr


   Documented by: 


Ampicillin Sodium 1 gm/ Sodium (Chloride)  100 mls @ 200 mls/hr IV Q4H MARILYN


   Last Admin: 21 04:15 Dose:  Not Given


   Documented by: 


Oxytocin/Lactated Ringer's (Pitocin In Lr 10 Units/1,000 Ml)  10 unit in 1,000 

mls @ 12 mls/hr IV TITRATE MARILYN; Protocol


   Last Titration: 21 01:00 Dose:  83.33 munits/min, 499.98 mls/hr


   Documented by: 


Nalbuphine HCl (Nalbuphine 10 Mg/1 Ml Vial)  10 mg IVPUSH Q2H PRN


   PRN Reason: Pain


Phenylephrine HCl (Phenylephrine/Normal Saline 100 Mcg/Ml 10 Ml Syringe)  1 mg 

.ROUTE .STK-MED ONE


   Stop: 21 15:01


Sodium Chloride (Sodium Chloride 0.9% 10 Ml Syringe)  10 ml FLUSH ASDIRECTED PRN


   PRN Reason: Keep Vein Open











- Infant Interaction


Infant Disposition, Postpartum:  in Room with Family


Infant Interaction: Holding Infant


Infant Feeding: Attempted Breastfeeding; Nursed Fair/Poor (Baby also being 

syringe fed )


Support Person: 





- Postpartum Recovery Exam


Fundal Tone: Firm


Fundal Level: 1 Fingerbreadths Below Umbilicus


Fundal Placement: Midline


Lochia Amount: Small


Lochia Color: Rubra/Red


Perineum Description: Other (see below)


Other Perinuem Description: 1st degree with repair


Episiotomy/Laceration: Approximated


Bladder Status: Voiding





- Exam


General: Alert, Oriented, Cooperative


GI/Abdominal Exam: Soft, Non-Tender





- Problem List & Annotations


(1) Abnormal  test


SNOMED Code(s): 655112780, 342459371


   Code(s): O28.9 - UNSP ABNORMAL FINDINGS ON  SCREENING OF MOTHER   

Status: Acute   Current Visit: Yes   





(2) 38 weeks gestation of pregnancy


SNOMED Code(s): 55300110


   Code(s): Z3A.38 - 38 WEEKS GESTATION OF PREGNANCY   Status: Acute   Current 

Visit: No   





(3) GBS (group B Streptococcus carrier), +RV culture, currently pregnant


SNOMED Code(s): 4999430189402, 272246951, 1105018851951


   Code(s): O99.820 - STREPTOCOCCUS B CARRIER STATE COMPLICATING PREGNANCY   

Status: Acute   Current Visit: No   





(4) Vaginal delivery


SNOMED Code(s): 750965493


   Code(s): O80 - ENCOUNTER FOR FULL-TERM UNCOMPLICATED DELIVERY   Status: Acute

  Current Visit: No   





- Problem List Review


Problem List Initiated/Reviewed/Updated: Yes





- My Orders


Last 24 Hours: 


My Active Orders





21 Breakfast


Regular Diet [DIET] 





21 02:20


Heat Therapy [OM.PC] PRN 














- Assessment


Assessment:: 





PPD#1





- Plan


Plan:: 





* Routine cares 


* Breast feeding


* Discharge home today if Peds discharges baby.  Otherwise discharge home 

  tomorrow